# Patient Record
Sex: MALE | Race: WHITE | NOT HISPANIC OR LATINO | Employment: FULL TIME | URBAN - METROPOLITAN AREA
[De-identification: names, ages, dates, MRNs, and addresses within clinical notes are randomized per-mention and may not be internally consistent; named-entity substitution may affect disease eponyms.]

---

## 2024-05-13 ENCOUNTER — OFFICE VISIT (OUTPATIENT)
Dept: ORTHOPEDIC SURGERY | Facility: CLINIC | Age: 49
End: 2024-05-13

## 2024-05-13 ENCOUNTER — HOSPITAL ENCOUNTER (OUTPATIENT)
Dept: RADIOLOGY | Facility: CLINIC | Age: 49
Discharge: HOME | End: 2024-05-13
Payer: COMMERCIAL

## 2024-05-13 VITALS — HEIGHT: 76 IN | BODY MASS INDEX: 32.27 KG/M2 | WEIGHT: 265 LBS

## 2024-05-13 DIAGNOSIS — M25.571 ACUTE RIGHT ANKLE PAIN: ICD-10-CM

## 2024-05-13 DIAGNOSIS — S82.831A DISPLACED FRACTURE OF DISTAL END OF RIGHT FIBULA: Primary | ICD-10-CM

## 2024-05-13 PROCEDURE — E0114 CRUTCH UNDERARM PAIR NO WOOD: HCPCS | Performed by: FAMILY MEDICINE

## 2024-05-13 PROCEDURE — 99203 OFFICE O/P NEW LOW 30 MIN: CPT | Performed by: FAMILY MEDICINE

## 2024-05-13 PROCEDURE — 73610 X-RAY EXAM OF ANKLE: CPT | Mod: RIGHT SIDE | Performed by: RADIOLOGY

## 2024-05-13 PROCEDURE — 73610 X-RAY EXAM OF ANKLE: CPT | Mod: RT

## 2024-05-13 NOTE — PROGRESS NOTES
History of Present Illness   Chief Complaint   Patient presents with    Right Ankle - Injury     Pt rolled his ankle going down stairs 5/9/2024       The patient is 48 y.o. male  here with a complaint of right ankle injury.  Injury occurred 4 days ago, tripped walking down some stairs sustaining inversion type ankle injury with acute onset of pain, difficulty walking/bearing weight, he was hopeful that symptoms would improve but they have persisted prompting him to be seen in our walk-in clinic today.  Pain is most prominent over the lateral aspect of his ankle, he does have some anterior medial discomfort, he admits to pain with attempts at walking/weightbearing, improves with rest.  He has been icing and elevating and taking over-the-counter medications for pain.  He has been wearing a boot from prior left ankle injury.  He denies any numbness or tingling.  He has history of left ankle fracture in 2019 that was treated surgically, no history of any prior right ankle problems.    No past medical history on file.    Medication Documentation Review Audit    **Prior to Admission medications have not yet been reviewed**         No Known Allergies    Social History     Socioeconomic History    Marital status:      Spouse name: Not on file    Number of children: Not on file    Years of education: Not on file    Highest education level: Not on file   Occupational History    Not on file   Tobacco Use    Smoking status: Every Day     Types: Cigarettes    Smokeless tobacco: Never   Substance and Sexual Activity    Alcohol use: Yes    Drug use: Never    Sexual activity: Not on file   Other Topics Concern    Not on file   Social History Narrative    Not on file     Social Determinants of Health     Financial Resource Strain: Not on file   Food Insecurity: Not on file   Transportation Needs: Not on file   Physical Activity: Not on file   Stress: Not on file   Social Connections: Not on file   Intimate Partner Violence:  Not on file   Housing Stability: Not on file       No past surgical history on file.       Review of Systems   GENERAL: Negative  GI: Negative  MUSCULOSKELETAL: See HPI  SKIN: Negative  NEURO:  Negative     Physical Exam:    General/Constitutional: well appearing, no distress, appears stated age  HEENT: sclera clear  Respiratory: non labored breathing  Vascular: No edema, swelling or tenderness, except as noted in detailed exam.  Integumentary: No impressive skin lesions present, except as noted in detailed exam.  Neurological:  Alert and oriented   Psychological:  Normal mood and affect.  Musculoskeletal: Normal, except as noted in detailed exam and in HPI.  Nonweightbearing with crutches    Right ankle: There is moderate soft tissue swelling somewhat diffusely, most prominent over the lateral aspect of the ankle.  There is ecchymosis present throughout ankle and foot.  He does have tenderness to palpation of the lateral malleolus, fracture is palpable with some crepitus.  There is some tenderness at the syndesmosis anteriorly as well as at the deltoid ligament medially.  He has limited range of motion of the ankle, dorsiflexion to neutral, plantarflexion 20 degrees, strength testing was deferred in setting of acute fracture as was a stability testing, sensation intact to light touch, 2+ pedal pulses       Imaging: X-rays of right ankle obtained today and independently reviewed.  There is a displaced oblique lateral malleolus fracture, there is widening of the ankle mortise.      Assessment   1. Displaced fracture of distal end of right fibula        2. Acute right ankle pain  XR ankle right 3+ views            Plan: Discussed diagnosis, reviewed x-rays, treatment with patient.  Given displacement, widening of ankle mortise I am recommending evaluation for surgical intervention/ORIF, he will continue with boot immobilization, nonweightbearing status with crutches.  He will follow-up with Dr. Whitlock later this week  for surgical consultation.

## 2024-05-15 ENCOUNTER — OFFICE VISIT (OUTPATIENT)
Dept: ORTHOPEDIC SURGERY | Facility: CLINIC | Age: 49
End: 2024-05-15
Payer: COMMERCIAL

## 2024-05-15 DIAGNOSIS — S82.61XA CLOSED DISPLACED FRACTURE OF LATERAL MALLEOLUS OF RIGHT FIBULA, INITIAL ENCOUNTER: Primary | ICD-10-CM

## 2024-05-15 PROCEDURE — 99213 OFFICE O/P EST LOW 20 MIN: CPT | Performed by: ORTHOPAEDIC SURGERY

## 2024-05-15 RX ORDER — SODIUM CHLORIDE, SODIUM LACTATE, POTASSIUM CHLORIDE, CALCIUM CHLORIDE 600; 310; 30; 20 MG/100ML; MG/100ML; MG/100ML; MG/100ML
100 INJECTION, SOLUTION INTRAVENOUS CONTINUOUS
Status: CANCELLED | OUTPATIENT
Start: 2024-05-21

## 2024-05-15 RX ORDER — CEFAZOLIN SODIUM 2 G/100ML
2 INJECTION, SOLUTION INTRAVENOUS ONCE
Status: CANCELLED | OUTPATIENT
Start: 2024-05-21 | End: 2024-05-15

## 2024-05-15 NOTE — PROGRESS NOTES
Subjective    Patient ID: Collin Dickerson is a 48 y.o. male.    Chief Complaint: Follow-up of the Right Ankle (SX DISCUSSION)     Last Surgery: No surgery found  Last Surgery Date: No surgery found    HPI  Patient comes in for follow-up of his right ankle fracture.  He originally was seen by Dr. Nicole and and referred to me to discuss surgery.  His injury occurred about 1 week ago.  He had sustained a displaced right lateral malleolus fracture.    Objective   Ortho Exam  Patient is in no acute distress.  Exam of his right ankle reveals he does have moderate swelling.  He is tender over the lateral malleolus.  He has no tenderness medially.  He is otherwise neurovascular intact in his right lower extremity.    Image Results:  XR ankle right 3+ views  Narrative: Interpreted By:  Yahaira Hoyos,   STUDY:  Right ankle, 3 views.      INDICATION:  Signs/Symptoms:pain/acute injury/rolled ankle.      COMPARISON:  None.      ACCESSION NUMBER(S):  [id number]      ORDERING CLINICIAN:  ALEXUS NICOLE      FINDINGS:  Mildly displaced acute oblique Saunders B distal fibular fracture with a  proximally 6 mm lateral displacement of the distal fracture fragment.  Widening of the medial clear space suggesting deltoid ligament  injury. Small os trigonum. No significant degenerative changes. Ankle  soft tissue swelling that is likely reactive in etiology.      Impression: Mildly displaced acute oblique Saunders B distal fibular fracture as  described above. Widening of the medial clear space suggesting  deltoid ligament injury.      MACRO:  None.      Signed by: Yahaira Hoyos 5/15/2024 5:45 AM  Dictation workstation:   [id number]      Assessment/Plan   Encounter Diagnoses:  Closed displaced fracture of lateral malleolus of right fibula, initial encounter    Orders Placed This Encounter    Request for Pre-Admission Testing Visit    Basic Metabolic Panel    Case Request Operating Room: Open Reduction Internal Fixation Ankle      Patient has a displaced right lateral malleolus ankle fracture.  I explained to him this typically would require surgical intervention.  I did explain to him there is a chance of requiring stabilization of the distal syndesmosis but typically this reduces well with fixation of the fibula.  I explained to him in detail the risk, benefits alternatives of a right ankle open reduction internal fixation.  The patient voiced understanding and informed consent was obtained.  The patient will be scheduled for surgery within a week's time.

## 2024-05-16 RX ORDER — CHLORHEXIDINE GLUCONATE ORAL RINSE 1.2 MG/ML
15 SOLUTION DENTAL AS NEEDED
Qty: 120 ML | Refills: 0 | Status: SHIPPED | OUTPATIENT
Start: 2024-05-16

## 2024-05-16 RX ORDER — CHLORHEXIDINE GLUCONATE 40 MG/ML
SOLUTION TOPICAL DAILY
Qty: 118 ML | Refills: 0 | Status: SHIPPED | OUTPATIENT
Start: 2024-05-16 | End: 2024-05-21

## 2024-05-20 ENCOUNTER — PRE-ADMISSION TESTING (OUTPATIENT)
Dept: PREADMISSION TESTING | Facility: HOSPITAL | Age: 49
End: 2024-05-20
Payer: COMMERCIAL

## 2024-05-20 VITALS
OXYGEN SATURATION: 98 % | WEIGHT: 255.73 LBS | HEIGHT: 76 IN | RESPIRATION RATE: 18 BRPM | BODY MASS INDEX: 31.14 KG/M2 | HEART RATE: 82 BPM | SYSTOLIC BLOOD PRESSURE: 159 MMHG | DIASTOLIC BLOOD PRESSURE: 109 MMHG | TEMPERATURE: 95.5 F

## 2024-05-20 DIAGNOSIS — S82.61XA CLOSED DISPLACED FRACTURE OF LATERAL MALLEOLUS OF RIGHT FIBULA, INITIAL ENCOUNTER: ICD-10-CM

## 2024-05-20 DIAGNOSIS — Z01.818 PRE-OP TESTING: Primary | ICD-10-CM

## 2024-05-20 LAB
ANION GAP SERPL CALC-SCNC: 12 MMOL/L (ref 10–20)
BUN SERPL-MCNC: 15 MG/DL (ref 6–23)
CALCIUM SERPL-MCNC: 9.5 MG/DL (ref 8.6–10.3)
CHLORIDE SERPL-SCNC: 99 MMOL/L (ref 98–107)
CO2 SERPL-SCNC: 29 MMOL/L (ref 21–32)
CREAT SERPL-MCNC: 0.81 MG/DL (ref 0.5–1.3)
EGFRCR SERPLBLD CKD-EPI 2021: >90 ML/MIN/1.73M*2
ERYTHROCYTE [DISTWIDTH] IN BLOOD BY AUTOMATED COUNT: 11.9 % (ref 11.5–14.5)
EST. AVERAGE GLUCOSE BLD GHB EST-MCNC: 212 MG/DL
GLUCOSE SERPL-MCNC: 207 MG/DL (ref 74–99)
HBA1C MFR BLD: 9 %
HCT VFR BLD AUTO: 49.1 % (ref 41–52)
HGB BLD-MCNC: 16.9 G/DL (ref 13.5–17.5)
MCH RBC QN AUTO: 30.9 PG (ref 26–34)
MCHC RBC AUTO-ENTMCNC: 34.4 G/DL (ref 32–36)
MCV RBC AUTO: 90 FL (ref 80–100)
NRBC BLD-RTO: 0 /100 WBCS (ref 0–0)
PLATELET # BLD AUTO: 233 X10*3/UL (ref 150–450)
POTASSIUM SERPL-SCNC: 4.5 MMOL/L (ref 3.5–5.3)
RBC # BLD AUTO: 5.47 X10*6/UL (ref 4.5–5.9)
SODIUM SERPL-SCNC: 135 MMOL/L (ref 136–145)
WBC # BLD AUTO: 8.1 X10*3/UL (ref 4.4–11.3)

## 2024-05-20 PROCEDURE — 93010 ELECTROCARDIOGRAM REPORT: CPT | Performed by: STUDENT IN AN ORGANIZED HEALTH CARE EDUCATION/TRAINING PROGRAM

## 2024-05-20 PROCEDURE — 36415 COLL VENOUS BLD VENIPUNCTURE: CPT

## 2024-05-20 PROCEDURE — 83036 HEMOGLOBIN GLYCOSYLATED A1C: CPT

## 2024-05-20 PROCEDURE — 82374 ASSAY BLOOD CARBON DIOXIDE: CPT

## 2024-05-20 PROCEDURE — 87081 CULTURE SCREEN ONLY: CPT | Mod: PARLAB | Performed by: NURSE PRACTITIONER

## 2024-05-20 PROCEDURE — 85027 COMPLETE CBC AUTOMATED: CPT

## 2024-05-20 PROCEDURE — 93005 ELECTROCARDIOGRAM TRACING: CPT

## 2024-05-20 RX ORDER — IBUPROFEN 200 MG
200 TABLET ORAL EVERY 6 HOURS
COMMUNITY

## 2024-05-20 ASSESSMENT — DUKE ACTIVITY SCORE INDEX (DASI)
CAN YOU HAVE SEXUAL RELATIONS: NO
CAN YOU PARTICIPATE IN STRENOUS SPORTS LIKE SWIMMING, SINGLES TENNIS, FOOTBALL, BASKETBALL, OR SKIING: NO
CAN YOU DO LIGHT WORK AROUND THE HOUSE LIKE DUSTING OR WASHING DISHES: YES
CAN YOU DO MODERATE WORK AROUND THE HOUSE LIKE VACUUMING, SWEEPING FLOORS OR CARRYING GROCERIES: NO
DASI METS SCORE: 3.6
CAN YOU CLIMB A FLIGHT OF STAIRS OR WALK UP A HILL: NO
CAN YOU PARTICIPATE IN MODERATE RECREATIONAL ACTIVITIES LIKE GOLF, BOWLING, DANCING, DOUBLES TENNIS OR THROWING A BASEBALL OR FOOTBALL: NO
CAN YOU WALK A BLOCK OR TWO ON LEVEL GROUND: NO
CAN YOU TAKE CARE OF YOURSELF (EAT, DRESS, BATHE, OR USE TOILET): YES
CAN YOU DO YARD WORK LIKE RAKING LEAVES, WEEDING OR PUSHING A MOWER: NO
TOTAL_SCORE: 7.2
CAN YOU RUN A SHORT DISTANCE: NO
CAN YOU WALK INDOORS, SUCH AS AROUND YOUR HOUSE: YES
CAN YOU DO HEAVY WORK AROUND THE HOUSE LIKE SCRUBBING FLOORS OR LIFTING AND MOVING HEAVY FURNITURE: NO

## 2024-05-20 ASSESSMENT — PAIN SCALES - GENERAL: PAINLEVEL_OUTOF10: 5 - MODERATE PAIN

## 2024-05-20 ASSESSMENT — PAIN DESCRIPTION - DESCRIPTORS: DESCRIPTORS: POUNDING

## 2024-05-20 ASSESSMENT — PAIN - FUNCTIONAL ASSESSMENT: PAIN_FUNCTIONAL_ASSESSMENT: 0-10

## 2024-05-20 NOTE — CPM/PAT H&P
CPM/PAT Evaluation       Name: Collin Dickerson (Collin Dickerson)  /Age: 1975/48 y.o.     In-Person       Chief Complaint: Right ankle fracture     48 yr old male with c/o Right ankle injury.  Reports injury occurred on 2024 during a mistep and fall down steps at home, denies any other injury including loss of consciousness or head injury.  Immediately felt pain, swelling, brusing and was not able to walk on it.  Followed up with ortho on , had xrays showing fracture and need for surgery.  Currently using walking boot to Right LE and crutches to ambulate.  Pain is constant ache, burning and accompanied with difficulty walking.   Denies numbness/tingling and demonstrated wiggled toes.  Has tried ice, rest, elevation and medications with no lasting relief. Reports prior to injury, was moderately active, denies cardiac or respiratory symptoms.  Denies past issues with anesthesia.     Patient reports no current prescribed medication, adding he stopped taking medications end of last year.  Discussed importance of taking prescribed medications and following up/communicating with PCP regarding medications, patient verbalized understanding.  Has hx of Left ankle surgical repair with hardware on 2019 w/Dr Lopresti.        Followed by PCP (Amie TANNERTunica) - last visit on 10/12/2024   Todays lab results faxed to PCP at [phone]          Past Medical History:   Diagnosis Date    Hypertension     Type 2 diabetes mellitus (Multi)        History reviewed. No pertinent surgical history.    Patient  has no history on file for sexual activity.    No family history on file.    No Known Allergies    Prior to Admission medications    Medication Sig Start Date End Date Taking? Authorizing Provider   chlorhexidine (Hibiclens) 4 % external liquid Apply topically once daily for 5 days. Begin using CHG for a total of 5 days before surgery Day 5 is the day of surgery See directions from the hospital  5/16/24 5/21/24  DEANNA Geller-CNP   chlorhexidine (Peridex) 0.12 % solution Use 15 mL in the mouth or throat if needed for wound care (oral rinse the night before surgery and the morning on the day of surgery) for up to 2 doses. Swish in mouth and spit out 5/16/24   DEANNA Geller-CNP        Review of Systems    Constitutional: no fever, no chills and not feeling poorly.   Eyes: no eyesight problems.   ENT: no hearing loss, no nosebleeds and no sore throat.   Cardiovascular: no chest pain, no palpitations and no extremity edema.   Respiratory: no shortness of breath, no wheezing, no cough and no sob with exertion.   Gastrointestinal: negative for abdominal pain, blood in stools or changes in bowel habits   Genitourinary: negative for dysuria, incontinence or changes in urinary habits   Musculoskeletal: see HPI   Integumentary: negative for lesions, rash or itching.   Neurological: negative for confusion, dizziness, fainting or difficulty walking.   Psychiatric: not suicidal, no anxiety and no depression.   All other systems have been reviewed and are negative for complaint.     Physical Exam  Constitutional:       General: No acute distress.     Aox3, pleasant and cooperative, appropriate mood and eye contact   HENT:      Head: Normocephalic.      Mouth/Throat: Mucous membranes moist & pink  Eyes:      Vision grossly intact, PERRLA   Neck:      No carotid bruit, no JVD  Cardiovascular:      RRR, S1S2, no murmurs, rubs or gallops  Pulmonary:      Symmetric chest expansion, CTA, Room Air  Abdominal:      Soft non-tender, BSx4   Skin:     Warm, dry & intact   Extremities:      Current walking boot to RLE, crutches for ambulation   Neurological:      No focal deficit, Aox3, BRUMFIELD x4  Psychiatric:      Pleasant & cooperative, appropriate affect    PAT AIRWAY:   Airway:     Mallampati::  II    TM distance::  >3 FB    Neck ROM::  Full  normal        Visit Vitals  BP (!) 159/109   Pulse 82   Temp 35.3 °C  (95.5 °F) (Tympanic)   Resp 18       DASI Risk Score      Flowsheet Row Most Recent Value   DASI SCORE 7.2   METS Score (Will be calculated only when all the questions are answered) 3.6          Caprini DVT Assessment    No data to display       Modified Frailty Index    No data to display       CHADS2 Stroke Risk  Current as of 9 minutes ago        N/A 3 to 100%: High Risk   2 to < 3%: Medium Risk   0 to < 2%: Low Risk     Last Change: N/A          This score determines the patient's risk of having a stroke if the patient has atrial fibrillation.        This score is not applicable to this patient. Components are not calculated.          Revised Cardiac Risk Index    No data to display       Apfel Simplified Score    No data to display       Risk Analysis Index Results This Encounter    No data found in the last 1 encounters.       Stop Bang Score      Flowsheet Row Most Recent Value   Do you snore loudly? 0   Do you often feel tired or fatigued after your sleep? 0   Has anyone ever observed you stop breathing in your sleep? 0   Do you have or are you being treated for high blood pressure? 0   Recent BMI (Calculated) 31.1   Is BMI greater than 35 kg/m2? 0=No   Age older than 50 years old? 0=No   Gender - Male 1=Yes            Assessment and Plan:     Followed by ortho, Closed displaced fracture of lateral malleolus of Right fibula    Right lateral malleolus fracture ORIF w/c-arm w/Dr Whitlock on 5/21/2024    Reviewed todays ecg

## 2024-05-20 NOTE — H&P (VIEW-ONLY)
CPM/PAT Evaluation       Name: Collin Dickerson (Collin Dickerson)  /Age: 1975/48 y.o.     In-Person       Chief Complaint: Right ankle fracture     48 yr old male with c/o Right ankle injury.  Reports injury occurred on 2024 during a mistep and fall down steps at home, denies any other injury including loss of consciousness or head injury.  Immediately felt pain, swelling, brusing and was not able to walk on it.  Followed up with ortho on , had xrays showing fracture and need for surgery.  Currently using walking boot to Right LE and crutches to ambulate.  Pain is constant ache, burning and accompanied with difficulty walking.   Denies numbness/tingling and demonstrated wiggled toes.  Has tried ice, rest, elevation and medications with no lasting relief. Reports prior to injury, was moderately active, denies cardiac or respiratory symptoms.  Denies past issues with anesthesia.     Patient reports no current prescribed medication, adding he stopped taking medications end of last year.  Discussed importance of taking prescribed medications and following up/communicating with PCP regarding medications, patient verbalized understanding.  Has hx of Left ankle surgical repair with hardware on 2019 w/Dr Lopresti.        Followed by PCP (Amie TANNERRandall) - last visit on 10/12/2024   Todays lab results faxed to PCP at [phone]          Past Medical History:   Diagnosis Date    Hypertension     Type 2 diabetes mellitus (Multi)        History reviewed. No pertinent surgical history.    Patient  has no history on file for sexual activity.    No family history on file.    No Known Allergies    Prior to Admission medications    Medication Sig Start Date End Date Taking? Authorizing Provider   chlorhexidine (Hibiclens) 4 % external liquid Apply topically once daily for 5 days. Begin using CHG for a total of 5 days before surgery Day 5 is the day of surgery See directions from the hospital  5/16/24 5/21/24  DEANNA Geller-CNP   chlorhexidine (Peridex) 0.12 % solution Use 15 mL in the mouth or throat if needed for wound care (oral rinse the night before surgery and the morning on the day of surgery) for up to 2 doses. Swish in mouth and spit out 5/16/24   DEANNA Geller-CNP        Review of Systems    Constitutional: no fever, no chills and not feeling poorly.   Eyes: no eyesight problems.   ENT: no hearing loss, no nosebleeds and no sore throat.   Cardiovascular: no chest pain, no palpitations and no extremity edema.   Respiratory: no shortness of breath, no wheezing, no cough and no sob with exertion.   Gastrointestinal: negative for abdominal pain, blood in stools or changes in bowel habits   Genitourinary: negative for dysuria, incontinence or changes in urinary habits   Musculoskeletal: see HPI   Integumentary: negative for lesions, rash or itching.   Neurological: negative for confusion, dizziness, fainting or difficulty walking.   Psychiatric: not suicidal, no anxiety and no depression.   All other systems have been reviewed and are negative for complaint.     Physical Exam  Constitutional:       General: No acute distress.     Aox3, pleasant and cooperative, appropriate mood and eye contact   HENT:      Head: Normocephalic.      Mouth/Throat: Mucous membranes moist & pink  Eyes:      Vision grossly intact, PERRLA   Neck:      No carotid bruit, no JVD  Cardiovascular:      RRR, S1S2, no murmurs, rubs or gallops  Pulmonary:      Symmetric chest expansion, CTA, Room Air  Abdominal:      Soft non-tender, BSx4   Skin:     Warm, dry & intact   Extremities:      Current walking boot to RLE, crutches for ambulation   Neurological:      No focal deficit, Aox3, BRUMFIELD x4  Psychiatric:      Pleasant & cooperative, appropriate affect    PAT AIRWAY:   Airway:     Mallampati::  II    TM distance::  >3 FB    Neck ROM::  Full  normal        Visit Vitals  BP (!) 159/109   Pulse 82   Temp 35.3 °C  (95.5 °F) (Tympanic)   Resp 18       DASI Risk Score      Flowsheet Row Most Recent Value   DASI SCORE 7.2   METS Score (Will be calculated only when all the questions are answered) 3.6          Caprini DVT Assessment    No data to display       Modified Frailty Index    No data to display       CHADS2 Stroke Risk  Current as of 9 minutes ago        N/A 3 to 100%: High Risk   2 to < 3%: Medium Risk   0 to < 2%: Low Risk     Last Change: N/A          This score determines the patient's risk of having a stroke if the patient has atrial fibrillation.        This score is not applicable to this patient. Components are not calculated.          Revised Cardiac Risk Index    No data to display       Apfel Simplified Score    No data to display       Risk Analysis Index Results This Encounter    No data found in the last 1 encounters.       Stop Bang Score      Flowsheet Row Most Recent Value   Do you snore loudly? 0   Do you often feel tired or fatigued after your sleep? 0   Has anyone ever observed you stop breathing in your sleep? 0   Do you have or are you being treated for high blood pressure? 0   Recent BMI (Calculated) 31.1   Is BMI greater than 35 kg/m2? 0=No   Age older than 50 years old? 0=No   Gender - Male 1=Yes            Assessment and Plan:     Followed by ortho, Closed displaced fracture of lateral malleolus of Right fibula    Right lateral malleolus fracture ORIF w/c-arm w/Dr Whitlock on 5/21/2024    Reviewed todays ecg

## 2024-05-20 NOTE — PREPROCEDURE INSTRUCTIONS
Medication List            Accurate as of May 20, 2024 11:23 AM. Always use your most recent med list.                * chlorhexidine 0.12 % solution  Commonly known as: Peridex  Use 15 mL in the mouth or throat if needed for wound care (oral rinse the night before surgery and the morning on the day of surgery) for up to 2 doses. Swish in mouth and spit out     * chlorhexidine 4 % external liquid  Commonly known as: Hibiclens  Apply topically once daily for 5 days. Begin using CHG for a total of 5 days before surgery Day 5 is the day of surgery See directions from the hospital     ibuprofen 200 mg tablet  Medication Adjustments for Surgery: Stop 7 days before surgery           * This list has 2 medication(s) that are the same as other medications prescribed for you. Read the directions carefully, and ask your doctor or other care provider to review them with you.                                  NPO Instructions:    Do not eat any food after midnight the night before your surgery/procedure.    Additional Instructions:     Day of Surgery:  You may have clear liquids until TWO hours before surgery/procedure.  This includes water, black tea/coffee, (no milk or cream) apple juice and electrolyte drinks (Gatorade)  Wear  comfortable loose fitting clothing  Do not use moisturizers, creams, lotions or perfume  All jewelry and valuables should be left at home    PRE-OPERATIVE INSTRUCTIONS FOR SURGERY    *Do not eat anything after midnight the night of surgery.  This includes food of any kind (including hard candy, cough drops, mints).   You may have up to 13.5 ounces of clear liquid  until TWO hours prior to your arrival time to the hospital.  This includes water, black tea/coffee, (no milk or cream) apple juice and electrolyte drinks (GATORADE).  You may chew gum until TWO hours prior you your surgery/procedure.         *One of our staff members will call you ONE business day before your surgery, between 11 am-2 pm to  let you know the time to arrive.  If you have not received a call by 2 pm, call [phone]  *When you arrive at the hospital-->GO TO Registration on the ground floor  *Stop smoking 24 hours prior to surgery.  No Marijuana, CBD Oil or Vaping for 48 hours  *No alcohol 24 hours prior to surgery  *You will need a responsible adult to drive you home  -No acrylic nails or nail polish on at least one fingernail, NO polish on toes for foot surgery  -You may be asked to remove your dentures, partial plate, eyeglasses or contact lenses before going to surgery.  Please bring a case for these items.  -Body piercings need to be removed.  Jewelry and valuables should be left at home.  -Put on loose,  comfortable, clean clothing, that will accommodate bandages        What is a home antibacterial shower?  This shower is a way of cleaning the skin with a germ killing solution before surgery.  The solution contains chlorhexidine, commonly known as CHG.  CHG is a skin cleanser with germ killing ability.  Let your doctor know if you are allergic to chlorhexidine.    Why do I need to take a preoperative antibacterial shower?  Skin is not sterile.  It is best to try to make your skin as free of germs as possible before surgery.  Proper cleansing with a germ killing soap before surgery can lower the number of germs on your skin.  This helps to reduce the risk of infection at the surgical site.  Following the instructions listed below will help you prepare your skin for surgery.      How do I use the solution?    Steps: Begin using your CHG soap 5 days before your surgery on __________________.    *First, wash and rinse your hair using the CHG soap.  Keep CHG soap away from ear canals and eyes.   Rinse completely, do not condition.  Hair extensions should be removed.    *Wash your face with your normal soap and rinse.   *Apply the CHG solution to a clean wet washcloth.  Turn the water off or move away from the water spray to avoid  premature rinsing of the CHG soap as you are applying.  Firmly lather your entire body from the neck down.  Do not use on your face.    *Pay special attention to the area(s) where your incision(s) will be located unless they are on your face.  Avoid scrubbing your skin too hard.  The important part is to have the CHG soap sit on your skin for 3 minutes.   *When the 3 minutes are up, turn on the water and rinse the CHG solution off your body completely.  *Do not wash with regular soap after you  have  used the CHG soap solution.  *Pat  yourself dry with a clean, freshly laundered towel.  *Do not apply powders, deodorants or lotions.  *Dress in clean freshly laundered night clothes.    *Be sure to sleep with clean freshly laundered sheets.    *Be aware the CHG will cause stains on fabrics; if you wash them with bleach after use.  Rinse your washcloth and other linens that have contact with CHG completely.  Use only non-chlorine detergents to launder the items  used.  *The morning of surgery is the fifth day.  Repeat the above steps and dress in clean comfortable clothing.     What is oral/dental rinse?  It is mouthwash.  It is a way of cleaning the he mouth with a germ-killing solution before your surgery.  The solution contains chlorhexidine, commonly known as CHG.  It is used inside the mouth to kill a bacteria known as Staphylococcus aureus.  Let your doctor know if you are allergic to Chlorhexidine.    Why do I need to use CHG oral/ dental rinse?  The CHG oral/dental rinse helps to kill bacteria in your mouth know as Staphylococcus aureus.  This reduces the risk of infection at the surgical site.    Using your CHG oral/dental rinse    STEPS:    Use your CHG oral/dental rinse after you brush your teeth the night before (at bedtime) and the morning of your surgery.  Follow all the directions on your prescription label.  *Use the cap on the container to measure 15 ml  *Swish (gargle if you can) the mouthwash in your  mouth for at least 30 seconds, (do not swallow) and spit out  *After you use your CHG rinse, do not rinse your mouth with water, drink or eat.  Please refer to the prescription label for the appropriate time to resume oral intake.    What side effects might I have using the CHG oral/dental rinse?  CHG rinse will stick you plaque on the teeth.  Brush and floss just before use.   Teeth brushing will help avoid staining of the plaque during  use.  Teeth brushing will help avoid staining of plaque during  use.    Who should I contact if I have questions about the CHG oral/dental rinse and or CHG soap?  Please call [facility], Pre-Admission testing at [phone] if you have any questions.    What you may be asked to bring to surgery:  ___Crutches, walker        Arrival 1000 am for 1130 procedure

## 2024-05-21 ENCOUNTER — APPOINTMENT (OUTPATIENT)
Dept: RADIOLOGY | Facility: HOSPITAL | Age: 49
End: 2024-05-21
Payer: COMMERCIAL

## 2024-05-21 ENCOUNTER — ANESTHESIA EVENT (OUTPATIENT)
Dept: OPERATING ROOM | Facility: HOSPITAL | Age: 49
End: 2024-05-21
Payer: COMMERCIAL

## 2024-05-21 ENCOUNTER — ANESTHESIA (OUTPATIENT)
Dept: OPERATING ROOM | Facility: HOSPITAL | Age: 49
End: 2024-05-21
Payer: COMMERCIAL

## 2024-05-21 ENCOUNTER — HOSPITAL ENCOUNTER (OUTPATIENT)
Facility: HOSPITAL | Age: 49
Setting detail: OUTPATIENT SURGERY
Discharge: HOME | End: 2024-05-21
Attending: ORTHOPAEDIC SURGERY | Admitting: ORTHOPAEDIC SURGERY
Payer: COMMERCIAL

## 2024-05-21 VITALS
WEIGHT: 255.73 LBS | BODY MASS INDEX: 31.14 KG/M2 | HEART RATE: 86 BPM | TEMPERATURE: 97 F | DIASTOLIC BLOOD PRESSURE: 75 MMHG | HEIGHT: 76 IN | RESPIRATION RATE: 16 BRPM | OXYGEN SATURATION: 94 % | SYSTOLIC BLOOD PRESSURE: 143 MMHG

## 2024-05-21 DIAGNOSIS — Z01.818 PRE-OP TESTING: ICD-10-CM

## 2024-05-21 DIAGNOSIS — S82.61XA CLOSED DISPLACED FRACTURE OF LATERAL MALLEOLUS OF RIGHT FIBULA, INITIAL ENCOUNTER: ICD-10-CM

## 2024-05-21 LAB — GLUCOSE BLD MANUAL STRIP-MCNC: 188 MG/DL (ref 74–99)

## 2024-05-21 PROCEDURE — A27792 PR OPEN TX DISTAL FIBULAR FRACTURE LAT MALLEOLUS: Performed by: ANESTHESIOLOGY

## 2024-05-21 PROCEDURE — 3600000009 HC OR TIME - EACH INCREMENTAL 1 MINUTE - PROCEDURE LEVEL FOUR: Performed by: ORTHOPAEDIC SURGERY

## 2024-05-21 PROCEDURE — 2500000004 HC RX 250 GENERAL PHARMACY W/ HCPCS (ALT 636 FOR OP/ED): Performed by: NURSE ANESTHETIST, CERTIFIED REGISTERED

## 2024-05-21 PROCEDURE — 76000 FLUOROSCOPY <1 HR PHYS/QHP: CPT | Mod: 59

## 2024-05-21 PROCEDURE — 3700000001 HC GENERAL ANESTHESIA TIME - INITIAL BASE CHARGE: Performed by: ORTHOPAEDIC SURGERY

## 2024-05-21 PROCEDURE — C1713 ANCHOR/SCREW BN/BN,TIS/BN: HCPCS | Performed by: ORTHOPAEDIC SURGERY

## 2024-05-21 PROCEDURE — 3700000002 HC GENERAL ANESTHESIA TIME - EACH INCREMENTAL 1 MINUTE: Performed by: ORTHOPAEDIC SURGERY

## 2024-05-21 PROCEDURE — 7100000001 HC RECOVERY ROOM TIME - INITIAL BASE CHARGE: Performed by: ORTHOPAEDIC SURGERY

## 2024-05-21 PROCEDURE — 2500000004 HC RX 250 GENERAL PHARMACY W/ HCPCS (ALT 636 FOR OP/ED): Mod: JZ | Performed by: ORTHOPAEDIC SURGERY

## 2024-05-21 PROCEDURE — 27792 TREATMENT OF ANKLE FRACTURE: CPT | Performed by: ORTHOPAEDIC SURGERY

## 2024-05-21 PROCEDURE — A27792 PR OPEN TX DISTAL FIBULAR FRACTURE LAT MALLEOLUS: Performed by: NURSE ANESTHETIST, CERTIFIED REGISTERED

## 2024-05-21 PROCEDURE — 7100000010 HC PHASE TWO TIME - EACH INCREMENTAL 1 MINUTE: Performed by: ORTHOPAEDIC SURGERY

## 2024-05-21 PROCEDURE — 3600000004 HC OR TIME - INITIAL BASE CHARGE - PROCEDURE LEVEL FOUR: Performed by: ORTHOPAEDIC SURGERY

## 2024-05-21 PROCEDURE — 64445 NJX AA&/STRD SCIATIC NRV IMG: CPT | Performed by: ANESTHESIOLOGY

## 2024-05-21 PROCEDURE — 2500000005 HC RX 250 GENERAL PHARMACY W/O HCPCS: Performed by: NURSE ANESTHETIST, CERTIFIED REGISTERED

## 2024-05-21 PROCEDURE — 82947 ASSAY GLUCOSE BLOOD QUANT: CPT

## 2024-05-21 PROCEDURE — 2500000005 HC RX 250 GENERAL PHARMACY W/O HCPCS: Performed by: ORTHOPAEDIC SURGERY

## 2024-05-21 PROCEDURE — 7100000002 HC RECOVERY ROOM TIME - EACH INCREMENTAL 1 MINUTE: Performed by: ORTHOPAEDIC SURGERY

## 2024-05-21 PROCEDURE — 2780000003 HC OR 278 NO HCPCS: Performed by: ORTHOPAEDIC SURGERY

## 2024-05-21 PROCEDURE — 7100000009 HC PHASE TWO TIME - INITIAL BASE CHARGE: Performed by: ORTHOPAEDIC SURGERY

## 2024-05-21 PROCEDURE — 2720000007 HC OR 272 NO HCPCS: Performed by: ORTHOPAEDIC SURGERY

## 2024-05-21 DEVICE — PLATE LCP TUBULAR 1/3 69MM 6H: Type: IMPLANTABLE DEVICE | Site: ANKLE | Status: FUNCTIONAL

## 2024-05-21 DEVICE — SCREW, CORTICAL, SELF-TAPPING, 3.5 X 18 MM, STAINLESS STEEL: Type: IMPLANTABLE DEVICE | Site: ANKLE | Status: FUNCTIONAL

## 2024-05-21 DEVICE — SCREW, CANCELLOUS, FULL THREAD, 4 X 18 MM, STAINLESS STEEL: Type: IMPLANTABLE DEVICE | Site: ANKLE | Status: FUNCTIONAL

## 2024-05-21 DEVICE — SCREW LOCKING 3.5 W/RECESS 14: Type: IMPLANTABLE DEVICE | Site: ANKLE | Status: FUNCTIONAL

## 2024-05-21 DEVICE — SCREW, CANCELLOUS, FULL THREAD, 4 X 16 MM, STAINLESS STEEL: Type: IMPLANTABLE DEVICE | Site: ANKLE | Status: FUNCTIONAL

## 2024-05-21 DEVICE — SCREW, CORTICAL, SELF-TAPPING, 3.5 X 16 MM, STAINLESS STEEL: Type: IMPLANTABLE DEVICE | Site: ANKLE | Status: FUNCTIONAL

## 2024-05-21 RX ORDER — HYDRALAZINE HYDROCHLORIDE 20 MG/ML
5 INJECTION INTRAMUSCULAR; INTRAVENOUS EVERY 30 MIN PRN
Status: DISCONTINUED | OUTPATIENT
Start: 2024-05-21 | End: 2024-05-21 | Stop reason: HOSPADM

## 2024-05-21 RX ORDER — OXYCODONE AND ACETAMINOPHEN 5; 325 MG/1; MG/1
1 TABLET ORAL EVERY 6 HOURS PRN
Qty: 28 TABLET | Refills: 0 | Status: SHIPPED | OUTPATIENT
Start: 2024-05-21 | End: 2024-05-28

## 2024-05-21 RX ORDER — ACETAMINOPHEN 325 MG/1
650 TABLET ORAL EVERY 4 HOURS PRN
Status: DISCONTINUED | OUTPATIENT
Start: 2024-05-21 | End: 2024-05-21 | Stop reason: HOSPADM

## 2024-05-21 RX ORDER — BUPIVACAINE HYDROCHLORIDE 5 MG/ML
INJECTION, SOLUTION PERINEURAL AS NEEDED
Status: DISCONTINUED | OUTPATIENT
Start: 2024-05-21 | End: 2024-05-21 | Stop reason: HOSPADM

## 2024-05-21 RX ORDER — LABETALOL HYDROCHLORIDE 5 MG/ML
5 INJECTION, SOLUTION INTRAVENOUS ONCE AS NEEDED
Status: DISCONTINUED | OUTPATIENT
Start: 2024-05-21 | End: 2024-05-21 | Stop reason: HOSPADM

## 2024-05-21 RX ORDER — MIDAZOLAM HYDROCHLORIDE 1 MG/ML
INJECTION, SOLUTION INTRAMUSCULAR; INTRAVENOUS AS NEEDED
Status: DISCONTINUED | OUTPATIENT
Start: 2024-05-21 | End: 2024-05-21

## 2024-05-21 RX ORDER — ONDANSETRON HYDROCHLORIDE 2 MG/ML
4 INJECTION, SOLUTION INTRAVENOUS ONCE AS NEEDED
Status: DISCONTINUED | OUTPATIENT
Start: 2024-05-21 | End: 2024-05-21 | Stop reason: HOSPADM

## 2024-05-21 RX ORDER — ONDANSETRON HYDROCHLORIDE 2 MG/ML
INJECTION, SOLUTION INTRAVENOUS AS NEEDED
Status: DISCONTINUED | OUTPATIENT
Start: 2024-05-21 | End: 2024-05-21

## 2024-05-21 RX ORDER — PROPOFOL 10 MG/ML
INJECTION, EMULSION INTRAVENOUS AS NEEDED
Status: DISCONTINUED | OUTPATIENT
Start: 2024-05-21 | End: 2024-05-21

## 2024-05-21 RX ORDER — MEPERIDINE HYDROCHLORIDE 25 MG/ML
12.5 INJECTION INTRAMUSCULAR; INTRAVENOUS; SUBCUTANEOUS EVERY 10 MIN PRN
Status: DISCONTINUED | OUTPATIENT
Start: 2024-05-21 | End: 2024-05-21 | Stop reason: HOSPADM

## 2024-05-21 RX ORDER — PHENYLEPHRINE HCL IN 0.9% NACL 1 MG/10 ML
SYRINGE (ML) INTRAVENOUS AS NEEDED
Status: DISCONTINUED | OUTPATIENT
Start: 2024-05-21 | End: 2024-05-21

## 2024-05-21 RX ORDER — KETAMINE HCL IN NACL, ISO-OSM 100MG/10ML
SYRINGE (ML) INJECTION AS NEEDED
Status: DISCONTINUED | OUTPATIENT
Start: 2024-05-21 | End: 2024-05-21

## 2024-05-21 RX ORDER — SODIUM CHLORIDE, SODIUM LACTATE, POTASSIUM CHLORIDE, CALCIUM CHLORIDE 600; 310; 30; 20 MG/100ML; MG/100ML; MG/100ML; MG/100ML
100 INJECTION, SOLUTION INTRAVENOUS CONTINUOUS
Status: DISCONTINUED | OUTPATIENT
Start: 2024-05-21 | End: 2024-05-21 | Stop reason: HOSPADM

## 2024-05-21 RX ORDER — FENTANYL CITRATE 50 UG/ML
INJECTION, SOLUTION INTRAMUSCULAR; INTRAVENOUS
Status: DISCONTINUED
Start: 2024-05-21 | End: 2024-05-21 | Stop reason: HOSPADM

## 2024-05-21 RX ORDER — CEFAZOLIN SODIUM 2 G/100ML
2 INJECTION, SOLUTION INTRAVENOUS ONCE
Status: COMPLETED | OUTPATIENT
Start: 2024-05-21 | End: 2024-05-21

## 2024-05-21 RX ORDER — DIPHENHYDRAMINE HYDROCHLORIDE 50 MG/ML
12.5 INJECTION INTRAMUSCULAR; INTRAVENOUS ONCE AS NEEDED
Status: DISCONTINUED | OUTPATIENT
Start: 2024-05-21 | End: 2024-05-21 | Stop reason: HOSPADM

## 2024-05-21 RX ORDER — FENTANYL CITRATE 50 UG/ML
INJECTION, SOLUTION INTRAMUSCULAR; INTRAVENOUS AS NEEDED
Status: DISCONTINUED | OUTPATIENT
Start: 2024-05-21 | End: 2024-05-21

## 2024-05-21 RX ORDER — HYDROMORPHONE HYDROCHLORIDE 1 MG/ML
1 INJECTION, SOLUTION INTRAMUSCULAR; INTRAVENOUS; SUBCUTANEOUS EVERY 5 MIN PRN
Status: DISCONTINUED | OUTPATIENT
Start: 2024-05-21 | End: 2024-05-21 | Stop reason: HOSPADM

## 2024-05-21 RX ORDER — MIDAZOLAM HYDROCHLORIDE 1 MG/ML
INJECTION, SOLUTION INTRAMUSCULAR; INTRAVENOUS
Status: DISCONTINUED
Start: 2024-05-21 | End: 2024-05-21 | Stop reason: HOSPADM

## 2024-05-21 RX ORDER — MIDAZOLAM HYDROCHLORIDE 1 MG/ML
1 INJECTION, SOLUTION INTRAMUSCULAR; INTRAVENOUS ONCE AS NEEDED
Status: DISCONTINUED | OUTPATIENT
Start: 2024-05-21 | End: 2024-05-21 | Stop reason: HOSPADM

## 2024-05-21 RX ORDER — LIDOCAINE HCL/PF 100 MG/5ML
SYRINGE (ML) INTRAVENOUS AS NEEDED
Status: DISCONTINUED | OUTPATIENT
Start: 2024-05-21 | End: 2024-05-21

## 2024-05-21 RX ADMIN — Medication 200 MCG: at 12:34

## 2024-05-21 RX ADMIN — ONDANSETRON 4 MG: 2 INJECTION INTRAMUSCULAR; INTRAVENOUS at 12:24

## 2024-05-21 RX ADMIN — Medication 200 MCG: at 12:26

## 2024-05-21 RX ADMIN — SODIUM CHLORIDE, SODIUM LACTATE, POTASSIUM CHLORIDE, AND CALCIUM CHLORIDE: 600; 310; 30; 20 INJECTION, SOLUTION INTRAVENOUS at 11:27

## 2024-05-21 RX ADMIN — HYDROMORPHONE HYDROCHLORIDE 2 MG: 2 INJECTION, SOLUTION INTRAMUSCULAR; INTRAVENOUS; SUBCUTANEOUS at 12:00

## 2024-05-21 RX ADMIN — Medication 200 MCG: at 12:22

## 2024-05-21 RX ADMIN — DEXAMETHASONE SODIUM PHOSPHATE 8 MG: 4 INJECTION, SOLUTION INTRAMUSCULAR; INTRAVENOUS at 11:50

## 2024-05-21 RX ADMIN — FENTANYL CITRATE 100 MCG: 50 INJECTION, SOLUTION INTRAMUSCULAR; INTRAVENOUS at 10:24

## 2024-05-21 RX ADMIN — MIDAZOLAM 2 MG: 1 INJECTION INTRAMUSCULAR; INTRAVENOUS at 10:24

## 2024-05-21 RX ADMIN — Medication 30 MG: at 11:30

## 2024-05-21 RX ADMIN — Medication 200 MCG: at 12:28

## 2024-05-21 RX ADMIN — PROPOFOL 200 MG: 10 INJECTION, EMULSION INTRAVENOUS at 11:30

## 2024-05-21 RX ADMIN — Medication 100 MCG: at 12:19

## 2024-05-21 RX ADMIN — FENTANYL CITRATE 100 MCG: 50 INJECTION, SOLUTION INTRAMUSCULAR; INTRAVENOUS at 11:30

## 2024-05-21 RX ADMIN — CEFAZOLIN SODIUM 2 G: 2 INJECTION, SOLUTION INTRAVENOUS at 11:40

## 2024-05-21 RX ADMIN — LIDOCAINE HYDROCHLORIDE 100 MG: 20 INJECTION INTRAVENOUS at 11:30

## 2024-05-21 RX ADMIN — MIDAZOLAM 2 MG: 1 INJECTION INTRAMUSCULAR; INTRAVENOUS at 11:30

## 2024-05-21 RX ADMIN — Medication 200 MCG: at 12:40

## 2024-05-21 RX ADMIN — SODIUM CHLORIDE, SODIUM LACTATE, POTASSIUM CHLORIDE, AND CALCIUM CHLORIDE: 600; 310; 30; 20 INJECTION, SOLUTION INTRAVENOUS at 12:22

## 2024-05-21 SDOH — HEALTH STABILITY: MENTAL HEALTH: CURRENT SMOKER: 0

## 2024-05-21 ASSESSMENT — PAIN SCALES - GENERAL
PAINLEVEL_OUTOF10: 0 - NO PAIN
PAIN_LEVEL: 0
PAINLEVEL_OUTOF10: 0 - NO PAIN

## 2024-05-21 ASSESSMENT — COLUMBIA-SUICIDE SEVERITY RATING SCALE - C-SSRS
6. HAVE YOU EVER DONE ANYTHING, STARTED TO DO ANYTHING, OR PREPARED TO DO ANYTHING TO END YOUR LIFE?: NO
2. HAVE YOU ACTUALLY HAD ANY THOUGHTS OF KILLING YOURSELF?: NO
1. IN THE PAST MONTH, HAVE YOU WISHED YOU WERE DEAD OR WISHED YOU COULD GO TO SLEEP AND NOT WAKE UP?: NO

## 2024-05-21 ASSESSMENT — PAIN - FUNCTIONAL ASSESSMENT: PAIN_FUNCTIONAL_ASSESSMENT: 0-10

## 2024-05-21 NOTE — OP NOTE
RIGHT LATERAL MALLEOLUS FRACTURE OPEN REDUCTION INTERNAL FIXATION WITH C-ARM (R) Operative Note     Date: 2024  OR Location: [facility]    Name: Collin Dickerson, : 1975, Age: 48 y.o., MRN: [id number], Sex: male    Diagnosis  Pre-op Diagnosis     * Closed displaced fracture of lateral malleolus of right fibula, initial encounter [S82.61XA] Post-op Diagnosis     * Closed displaced fracture of lateral malleolus of right fibula, initial encounter [S82.61XA]     Procedures  RIGHT LATERAL MALLEOLUS FRACTURE OPEN REDUCTION INTERNAL FIXATION WITH C-ARM  05025 - ID OPEN TX DISTAL FIBULAR FRACTURE LAT MALLEOLUS      Surgeons      * Gurjit Whitlock - Primary    Resident/Fellow/Other Assistant:  Surgeons and Role:  * No surgeons found with a matching role *    Procedure Summary  Anesthesia: General  ASA: II  Anesthesia Staff: Anesthesiologist: Fermin Martinez MD  CRNA: DEANNA Arnold-CRNA  Estimated Blood Loss: 5 mL  Intra-op Medications:   Administrations occurring from 1130 to 1330 on 24:   Medication Name Total Dose   BUPivacaine HCl (Marcaine) 0.5 % (5 mg/mL) injection 10 mL   lactated Ringer's infusion 141.67 mL   ceFAZolin in dextrose (iso-os) (Ancef) IVPB 2 g 2 g              Anesthesia Record               Intraprocedure I/O Totals          Intake    lactated Ringer's infusion 1000.00 mL    Total Intake 1000 mL          Specimen: No specimens collected     Staff:   Circulator: Magui Hollis RN  Relief Circulator: Annabel Martinez RN  Scrub Person: Denise Michel; Jeni Em         Drains and/or Catheters: * None in log *    Tourniquet Times:   37 minutes at 300 mmHg on right thigh    Implants:  Implants       Type Name Action Serial No.      Screw PLATE LCP TUBULAR 1/3 69MM 6H - [id number] Implanted      Screw SCREW LOCKING 3.5 W/RECESS 14 - [id number] Implanted      Screw SCREW, CORTICAL, SELF-TAPPING, 3.5 X 16 MM, STAINLESS STEEL - [id number] Implanted      Screw SCREW LOCKING 3.5  W/RECESS 12 - [id number] Wasted      Screw SCREW, CANCELLOUS, FULL THREAD, 4 X 16 MM, STAINLESS STEEL - [id number] Implanted      Screw SCREW, CORTICAL, SELF-TAPPING, 3.5 X 18 MM, STAINLESS STEEL - [id number] Implanted      Screw SCREW LOCKING 3.5 W/RECESS 18 - [id number] Wasted      Screw SCREW, CANCELLOUS, FULL THREAD, 4 X 18 MM, STAINLESS STEEL - [id number] Implanted               Findings: Displaced right lateral malleolus ankle fracture with intact syndesmosis    Indications: Collin Dickerson is an 48 y.o. male who is having surgery for Closed displaced fracture of lateral malleolus of right fibula, initial encounter [S82.61XA].  Patient had fallen at home and sustained a displaced right lateral malleolus ankle fracture.  There is questionable widening of the medial clear space.  Given these findings I explained to the patient typically this fracture to be treated surgically.  I explained to him in detail the risk, benefits alternatives of a right lateral malleolus open reduction internal fixation with possible syndesmotic fixation.  I explained to him that the risks of the procedure include but are not limited to infection, damage to nerve tendon and blood vessels, postoperative pain and stiffness, malunion, nonunion, hardware failure, as well as risks associate with anesthesia.  The patient voiced understanding and informed consent was obtained.    The patient was seen in the preoperative area. The risks, benefits, complications, treatment options, non-operative alternatives, expected recovery and outcomes were discussed with the patient. The possibilities of reaction to medication, pulmonary aspiration, injury to surrounding structures, bleeding, recurrent infection, the need for additional procedures, failure to diagnose a condition, and creating a complication requiring transfusion or operation were discussed with the patient. The patient concurred with the proposed plan, giving informed consent.   The site of surgery was properly noted/marked if necessary per policy. The patient has been actively warmed in preoperative area. Preoperative antibiotics have been ordered and given within 1 hours of incision. Venous thrombosis prophylaxis have been ordered including unilateral sequential compression device    Procedure Details: The patient was prepped identified in preoperative waiting area and his right ankle was marked as site of surgery.  Ancef was administered intravenously.  Patient received a popliteal nerve block in preop.  Patient was taken back to the operating room suite placed supine on the OR table.  A nonsterile tourniquet was applied to the patient's right thigh.  After general anesthesia with LMA was administered, patient's right lower extremity was prepped and draped in usual sterile fashion.  A preoperative verification timeout was taken.  At this point patient's right lower extremity was exsanguinated and Esmarch bandage and a tourniquet inflated to 300 mmHg.  I made approximately a 6 inch longitudinal incision overlying the lateral malleolus.  Sharp dissection was carried through skin and subcutaneous tissue.  Electrocautery was used to achieve hemostasis.  I then identified and protected branches of the sural nerve.  I dissected down to level the lateral malleolus.  Fracture site was identified.  It was relatively transverse in nature.  Fracture site was cleaned out and irrigated out.  I use longitudinal traction to provisionally reduce the fracture.  I held in place with bone to bone clamps.  I used C-arm fluoroscopy to confirm fracture reduction and I was satisfied with this.  At this point I secured fracture fixation with a Synthes 6 hole one third tubular plate.  Distally I placed a locking screw and a 4.0 mm cancellous screw.  Proximally I placed three 3.5 mm cortical screws.  These were all placed in standard AO technique.  I used C-arm fluoroscopy in orthogonal views to confirm fracture  reduction and hardware placement.  I satisfied with this.  I performed a stress test under C-arm fluoroscopy and there was no widening of the medial clear space.  Given this finding there was no need to place syndesmotic fixation.  Wound was irrigated with normal saline.  Deep fascia was closed with 0 Vicryl.  Tourniquet was let down after 37 minutes.  Good vascular return was seen to the patient's right lower extremity.  Subcutaneous tissue was closed with 2-0 Vicryl.  Skin was closed with 3-0 nylon suture.  10 mL of half percent plain Marcaine was injected further as a local anesthetic.  Sterile bandage consisting of Xeroform, gauze 4 x 4's and Webril was applied to the patient's right ankle.  Patient was then placed in a short leg bulky Ingram posterior mold splint with stirrups.  Patient was awakened from anesthesia and returned to recovery room in stable condition.  There were no complications during the case.  All sponge and needle counts were correct at the end of the case.  Complications:  None; patient tolerated the procedure well.    Disposition: PACU - hemodynamically stable.  Condition: stable         Additional Details: None    Attending Attestation: I performed the procedure.    Gurjit Whitlock  Phone Number: [phone]

## 2024-05-21 NOTE — ANESTHESIA PROCEDURE NOTES
Airway  Date/Time: 5/21/2024 11:34 AM  Urgency: elective    Airway not difficult    Staffing  Performed: CRNA   Authorized by: Fermin Martinez MD    Performed by: SUNITHA Arnold  Patient location during procedure: OR    Indications and Patient Condition  Indications for airway management: anesthesia  Spontaneous ventilation: present  Sedation level: deep  Preoxygenated: yes  Patient position: sniffing  Mask difficulty assessment: 1 - vent by mask    Final Airway Details  Final airway type: supraglottic airway      Successful airway: Size 5     Number of attempts at approach: 1  Ventilation between attempts: none  Number of other approaches attempted: 0    Additional Comments  Lower middle tooth noted to be loose with a small amount of bleeding after LMA was plAced: phenylephrine soaked gauze placed on tooth & gum

## 2024-05-21 NOTE — ANESTHESIA PREPROCEDURE EVALUATION
Patient: Collin Dickerson    Procedure Information       Date/Time: 05/21/24 1130    Procedure: RIGHT LATERAL MALLEOLUS FRACTURE OPEN REDUCTION INTERNAL FIXATION WITH C-ARM (Right: Ankle)    Location: [facility] / [facility]    Surgeons: Gurjit Whitlock MD            Relevant Problems   Anesthesia (within normal limits)       Clinical information reviewed:   Tobacco  Allergies  Meds   Med Hx  Surg Hx   Fam Hx          NPO Detail:  NPO/Void Status  Date of Last Liquid: 05/21/24  Time of Last Liquid: 0630  Date of Last Solid: 05/20/24  Time of Last Solid: 1800         Physical Exam    Airway  Mallampati: II  TM distance: >3 FB  Neck ROM: full     Cardiovascular - normal exam  Rhythm: regular  Rate: normal     Dental - normal exam     Pulmonary - normal exam     Abdominal            Anesthesia Plan    History of general anesthesia?: yes  History of complications of general anesthesia?: no    ASA 2     general and regional     The patient is not a current smoker.    intravenous induction   Postoperative administration of opioids is intended.  Trial extubation is planned.  Anesthetic plan and risks discussed with patient.  Use of blood products discussed with patient who consented to blood products.    Plan discussed with CRNA.

## 2024-05-21 NOTE — BRIEF OP NOTE
Date: 2024  OR Location: [facility]    Name: Collin Dickerson, : 1975, Age: 48 y.o., MRN: [id number], Sex: male    Diagnosis  Pre-op Diagnosis     * Closed displaced fracture of lateral malleolus of right fibula, initial encounter [S82.61XA] Post-op Diagnosis     * Closed displaced fracture of lateral malleolus of right fibula, initial encounter [S82.61XA]     Procedures  RIGHT LATERAL MALLEOLUS FRACTURE OPEN REDUCTION INTERNAL FIXATION WITH C-ARM  87676 - AK OPEN TX DISTAL FIBULAR FRACTURE LAT MALLEOLUS      Surgeons      * Gurjit Whitlock - Primary    Resident/Fellow/Other Assistant:  Surgeons and Role:  * No surgeons found with a matching role *    Procedure Summary  Anesthesia: General  ASA: II  Anesthesia Staff: Anesthesiologist: Fermin Martinez MD  CRNA: DEANNA Arnold-CRNA  Estimated Blood Loss: 5 mL  Intra-op Medications:   Administrations occurring from 1130 to 1330 on 24:   Medication Name Total Dose   BUPivacaine HCl (Marcaine) 0.5 % (5 mg/mL) injection 10 mL   lactated Ringer's infusion 141.67 mL   ceFAZolin in dextrose (iso-os) (Ancef) IVPB 2 g 2 g              Anesthesia Record               Intraprocedure I/O Totals          Intake    lactated Ringer's infusion 1000.00 mL    Total Intake 1000 mL          Specimen: No specimens collected     Staff:   Circulator: Magui Hollis RN  Relief Circulator: Annabel Martinez RN  Scrub Person: Denise Michel; Jeni Em          Findings: Displaced right lateral malleolus ankle fracture    Complications:  None; patient tolerated the procedure well.     Disposition: PACU - hemodynamically stable.  Condition: stable  Specimens Collected: No specimens collected  Attending Attestation: I performed the procedure.    Gurjit Whitlock  Phone Number: [phone]

## 2024-05-21 NOTE — ADDENDUM NOTE
Addendum  created 05/21/24 1410 by DEANNA Arnold-CRNA    Clinical Note Signed, Intraprocedure Blocks edited

## 2024-05-21 NOTE — ANESTHESIA POSTPROCEDURE EVALUATION
Patient: Collin Dickerson    Procedure Summary       Date: 05/21/24 Room / Location: [facility] / [facility]    Anesthesia Start: 1127 Anesthesia Stop:     Procedure: RIGHT LATERAL MALLEOLUS FRACTURE OPEN REDUCTION INTERNAL FIXATION WITH C-ARM (Right: Ankle) Diagnosis:       Closed displaced fracture of lateral malleolus of right fibula, initial encounter      (Closed displaced fracture of lateral malleolus of right fibula, initial encounter [S82.61XA])    Surgeons: Gurjit Whitlock MD Responsible Provider: Fermin Martinez MD    Anesthesia Type: general, regional ASA Status: 2            Anesthesia Type: general, regional    Vitals Value Taken Time   /96 05/21/24 1310   Temp 36.1 05/21/24 1310   Pulse 96 05/21/24 1309   Resp 18 05/21/24 1310   SpO2 99 % 05/21/24 1309   Vitals shown include unfiled device data.    Anesthesia Post Evaluation    Patient location during evaluation: PACU  Patient participation: complete - patient participated  Level of consciousness: awake and alert  Pain score: 0  Pain management: adequate  Airway patency: patent  Cardiovascular status: acceptable and stable  Respiratory status: acceptable, nasal cannula, spontaneous ventilation and nonlabored ventilation  Hydration status: acceptable  Postoperative Nausea and Vomiting: none  Comments: Pt was asked in pre op about having any loose or chipped teeth and pt denied. On intubation we found out that lt. incisor tooth was loose. On emergence, pt admitted that he had a loose tooth pre op and forgot to mention during pre anesthesia evaluation.        There were no known notable events for this encounter.

## 2024-05-21 NOTE — ADDENDUM NOTE
Addendum  created 05/21/24 1412 by Fermin Martinez MD    Clinical Note Signed, Intraprocedure Blocks edited, Intraprocedure Meds edited

## 2024-05-21 NOTE — ANESTHESIA PROCEDURE NOTES
Peripheral Block    Patient location during procedure: pre-op  Start time: 5/21/2024 10:24 AM  End time: 5/21/2024 10:34 AM  Reason for block: at surgeon's request and post-op pain management  Staffing  Performed: attending   Authorized by: Fermin Martinez MD    Performed by: Fermin Martinez MD  Preanesthetic Checklist  Completed: patient identified, IV checked, site marked, risks and benefits discussed, surgical consent, monitors and equipment checked, pre-op evaluation and timeout performed   Timeout performed at: 5/21/2024 10:24 AM  Peripheral Block  Patient position: laying flat  Prep: ChloraPrep  Patient monitoring: heart rate, cardiac monitor and continuous pulse ox  Block type: popliteal  Injection technique: single-shot  Guidance: ultrasound guided  Local infiltration: ropivacaine  Infiltration strength: 0.5 %  Dose: 30 mL  Needle  Needle gauge: 22 G  Needle localization: ultrasound guidance  Test dose: negative  Assessment  Injection assessment: negative aspiration for heme, no paresthesia on injection, incremental injection and local visualized surrounding nerve on ultrasound  Paresthesia pain: none  Heart rate change: no  Slow fractionated injection: yes

## 2024-05-22 LAB — STAPHYLOCOCCUS SPEC CULT: NORMAL

## 2024-05-28 LAB
ATRIAL RATE: 82 BPM
P AXIS: 5 DEGREES
P OFFSET: 205 MS
P ONSET: 154 MS
PR INTERVAL: 132 MS
Q ONSET: 220 MS
QRS COUNT: 13 BEATS
QRS DURATION: 82 MS
QT INTERVAL: 374 MS
QTC CALCULATION(BAZETT): 436 MS
QTC FREDERICIA: 415 MS
R AXIS: 26 DEGREES
T AXIS: 42 DEGREES
T OFFSET: 407 MS
VENTRICULAR RATE: 82 BPM

## 2024-06-03 ENCOUNTER — HOSPITAL ENCOUNTER (OUTPATIENT)
Dept: RADIOLOGY | Facility: CLINIC | Age: 49
Discharge: HOME | End: 2024-06-03
Payer: COMMERCIAL

## 2024-06-03 ENCOUNTER — OFFICE VISIT (OUTPATIENT)
Dept: ORTHOPEDIC SURGERY | Facility: CLINIC | Age: 49
End: 2024-06-03
Payer: COMMERCIAL

## 2024-06-03 DIAGNOSIS — S82.61XD CLOSED DISPLACED FRACTURE OF LATERAL MALLEOLUS OF RIGHT FIBULA WITH ROUTINE HEALING, SUBSEQUENT ENCOUNTER: Primary | ICD-10-CM

## 2024-06-03 DIAGNOSIS — S82.61XD CLOSED DISPLACED FRACTURE OF LATERAL MALLEOLUS OF RIGHT FIBULA WITH ROUTINE HEALING, SUBSEQUENT ENCOUNTER: ICD-10-CM

## 2024-06-03 PROCEDURE — 73610 X-RAY EXAM OF ANKLE: CPT | Mod: RT

## 2024-06-03 PROCEDURE — 73610 X-RAY EXAM OF ANKLE: CPT | Mod: RIGHT SIDE | Performed by: RADIOLOGY

## 2024-06-03 PROCEDURE — 99024 POSTOP FOLLOW-UP VISIT: CPT | Performed by: ORTHOPAEDIC SURGERY

## 2024-06-03 RX ORDER — SULFAMETHOXAZOLE AND TRIMETHOPRIM 800; 160 MG/1; MG/1
1 TABLET ORAL 2 TIMES DAILY
Qty: 20 TABLET | Refills: 0 | Status: SHIPPED | OUTPATIENT
Start: 2024-06-03 | End: 2024-06-13

## 2024-06-03 NOTE — PROGRESS NOTES
Subjective    Patient ID: Collin Dickerson is a 48 y.o. male.    Chief Complaint: No chief complaint on file.     Last Surgery: Right Lateral Malleolus Fracture Open Reduction Internal Fixation With C-arm - Right  Last Surgery Date: 5/21/2024    HPI  Patient comes then for his postoperative visit after undergoing a right lateral malleolus ORIF.  He is using over-the-counter pain medication.  He denies numbness and paresthesias.    Objective   Ortho Exam  Patient is in no acute distress.  Exam of his right ankle reveals the incision has healed well.  There is mild erythema and a blister.  There was no warmth.  Swelling was as would typically be expected following surgery.  Image Results:  X-rays of his right ankle were personally reviewed.  They show adequate lamina at the fracture site.  The hardware is intact.  There is no widening of the medial clear space.    Assessment/Plan   Encounter Diagnoses:  Closed displaced fracture of lateral malleolus of right fibula with routine healing, subsequent encounter    Orders Placed This Encounter    XR ankle right 3+ views    sulfamethoxazole-trimethoprim (Bactrim DS) 800-160 mg tablet     Patient is doing satisfactory following his right ankle ORIF.  He will go into a boot but is nonweightbearing.  Because of the increased erythema however I did prescribe Bactrim.  The patient will follow-up in 4 weeks with x-rays of his right ankle.

## 2024-07-01 ENCOUNTER — APPOINTMENT (OUTPATIENT)
Dept: ORTHOPEDIC SURGERY | Facility: CLINIC | Age: 49
End: 2024-07-01
Payer: COMMERCIAL

## 2024-07-01 ENCOUNTER — HOSPITAL ENCOUNTER (OUTPATIENT)
Dept: RADIOLOGY | Facility: CLINIC | Age: 49
Discharge: HOME | End: 2024-07-01
Payer: COMMERCIAL

## 2024-07-01 VITALS — HEIGHT: 76 IN | BODY MASS INDEX: 32.27 KG/M2 | WEIGHT: 265 LBS

## 2024-07-01 DIAGNOSIS — S82.61XD CLOSED DISPLACED FRACTURE OF LATERAL MALLEOLUS OF RIGHT FIBULA WITH ROUTINE HEALING, SUBSEQUENT ENCOUNTER: Primary | ICD-10-CM

## 2024-07-01 DIAGNOSIS — S82.61XD CLOSED DISPLACED FRACTURE OF LATERAL MALLEOLUS OF RIGHT FIBULA WITH ROUTINE HEALING, SUBSEQUENT ENCOUNTER: ICD-10-CM

## 2024-07-01 PROCEDURE — 73610 X-RAY EXAM OF ANKLE: CPT | Mod: RIGHT SIDE | Performed by: RADIOLOGY

## 2024-07-01 PROCEDURE — 99024 POSTOP FOLLOW-UP VISIT: CPT | Performed by: ORTHOPAEDIC SURGERY

## 2024-07-01 PROCEDURE — 73610 X-RAY EXAM OF ANKLE: CPT | Mod: RT

## 2024-07-01 NOTE — PROGRESS NOTES
Subjective    Patient ID: Collin Dickerson is a 48 y.o. male.    Chief Complaint: Follow-up (F/U ORIF RT ANKLE FX/DOS: 5/21/24/)     Last Surgery: Right Lateral Malleolus Fracture Open Reduction Internal Fixation With C-arm - Right  Last Surgery Date: 5/21/2024    HPI  Patient comes in for follow-up of his right lateral malleolus ORIF.  He is about 6 weeks out from surgery.  He states that he wore the boot only for 1 week and has been walking on his right foot in regular shoes for the past 3 weeks.  He complains of pain more medially than laterally.    Objective   Ortho Exam  Patient is in no acute distress.  He is walking today in regular shoes with no evidence of an antalgic gait.  He has mild swelling laterally.  There is no tenderness bilaterally.  The incision is well-healed.    Image Results:  X-rays of his right ankle were personally reviewed.  They show adequate lamina at the fracture site.  The hardware is intact.  There is some evidence of healing across the fracture line but the fracture line is still visible.    Assessment/Plan   Encounter Diagnoses:  Closed displaced fracture of lateral malleolus of right fibula with routine healing, subsequent encounter    Orders Placed This Encounter    XR ankle right 3+ views     The patient has a nearly healed right lateral malleolus fracture, status post ORIF.  At this time he may undergo therapy.  He will slowly return to normal activities.  Will follow-up in 4 weeks with x-rays of his right ankle.

## 2024-08-05 ENCOUNTER — APPOINTMENT (OUTPATIENT)
Dept: ORTHOPEDIC SURGERY | Facility: CLINIC | Age: 49
End: 2024-08-05
Payer: COMMERCIAL

## 2024-08-07 ENCOUNTER — APPOINTMENT (OUTPATIENT)
Dept: ORTHOPEDIC SURGERY | Facility: CLINIC | Age: 49
End: 2024-08-07
Payer: COMMERCIAL

## (undated) DEVICE — CUFF, TOURNIQUET, 34 X 4, DUAL PORT/SNGL BLADDER, DISP, LF

## (undated) DEVICE — BANDAGE, ESMARK, 6 IN X 9 FT, STERILE

## (undated) DEVICE — APPLICATOR, CHLORAPREP, W/ORANGE TINT, 26ML

## (undated) DEVICE — BIT, DRILL, QUICK-COUPLING, 2.5 X 110 MM, STAINLESS STEEL, GOLD

## (undated) DEVICE — SCREW LOCKING 3.5 W/RECESS 18: Type: IMPLANTABLE DEVICE | Site: ANKLE | Status: NON-FUNCTIONAL

## (undated) DEVICE — BANDAGE, COFLEX, 6 X 5 YDS, FOAM TAN, STERILE, LF

## (undated) DEVICE — Device

## (undated) DEVICE — DRAPE COVER, C ARM, FLOUROSCAN IMAGING SYS

## (undated) DEVICE — WOUND SYSTEM, DEBRIDEMENT & CLEANING, O.R DUOPAK

## (undated) DEVICE — BAG, BANDED, 36IN X 28IN

## (undated) DEVICE — DRILL, 2.8 X 165

## (undated) DEVICE — SCREW LOCKING 3.5 W/RECESS 12: Type: IMPLANTABLE DEVICE | Site: ANKLE | Status: NON-FUNCTIONAL